# Patient Record
Sex: MALE | Race: BLACK OR AFRICAN AMERICAN | NOT HISPANIC OR LATINO | Employment: OTHER | URBAN - METROPOLITAN AREA
[De-identification: names, ages, dates, MRNs, and addresses within clinical notes are randomized per-mention and may not be internally consistent; named-entity substitution may affect disease eponyms.]

---

## 2017-08-13 ENCOUNTER — HOSPITAL ENCOUNTER (EMERGENCY)
Facility: HOSPITAL | Age: 57
Discharge: HOME | End: 2017-08-13
Attending: INTERNAL MEDICINE
Payer: MEDICARE

## 2017-08-13 VITALS
RESPIRATION RATE: 18 BRPM | SYSTOLIC BLOOD PRESSURE: 150 MMHG | WEIGHT: 197 LBS | BODY MASS INDEX: 27.58 KG/M2 | HEART RATE: 76 BPM | DIASTOLIC BLOOD PRESSURE: 97 MMHG | OXYGEN SATURATION: 98 % | HEIGHT: 71 IN | TEMPERATURE: 98 F

## 2017-08-13 DIAGNOSIS — R55 SYNCOPE: ICD-10-CM

## 2017-08-13 DIAGNOSIS — H54.3 BLIND IN BOTH EYES: ICD-10-CM

## 2017-08-13 DIAGNOSIS — N18.31 CHRONIC KIDNEY DISEASE (CKD) STAGE G3A/A1, MODERATELY DECREASED GLOMERULAR FILTRATION RATE (GFR) BETWEEN 45-59 ML/MIN/1.73 SQUARE METER AND ALBUMINURIA CREATININE RATIO LESS THAN 30 MG/G: ICD-10-CM

## 2017-08-13 DIAGNOSIS — E86.0 DEHYDRATION: ICD-10-CM

## 2017-08-13 DIAGNOSIS — N17.9 AKI (ACUTE KIDNEY INJURY): ICD-10-CM

## 2017-08-13 DIAGNOSIS — G40.909 SEIZURE CEREBRAL: Primary | ICD-10-CM

## 2017-08-13 LAB
ALBUMIN SERPL BCP-MCNC: 4.2 G/DL
ALLENS TEST: ABNORMAL
ALP SERPL-CCNC: 64 U/L
ALT SERPL W/O P-5'-P-CCNC: 30 U/L
ANION GAP SERPL CALC-SCNC: 12 MMOL/L
AST SERPL-CCNC: 19 U/L
BASOPHILS # BLD AUTO: 0.02 K/UL
BASOPHILS NFR BLD: 0.2 %
BILIRUB SERPL-MCNC: 0.3 MG/DL
BNP SERPL-MCNC: 23 PG/ML
BUN SERPL-MCNC: 21 MG/DL
CALCIUM SERPL-MCNC: 9.7 MG/DL
CHLORIDE SERPL-SCNC: 110 MMOL/L
CO2 SERPL-SCNC: 20 MMOL/L
CREAT SERPL-MCNC: 1.8 MG/DL
DELSYS: ABNORMAL
DIFFERENTIAL METHOD: ABNORMAL
EOSINOPHIL # BLD AUTO: 0.7 K/UL
EOSINOPHIL NFR BLD: 8.2 %
ERYTHROCYTE [DISTWIDTH] IN BLOOD BY AUTOMATED COUNT: 13.4 %
EST. GFR  (AFRICAN AMERICAN): 48 ML/MIN/1.73 M^2
EST. GFR  (NON AFRICAN AMERICAN): 41 ML/MIN/1.73 M^2
FIO2: 21
GLUCOSE SERPL-MCNC: 139 MG/DL (ref 70–110)
GLUCOSE SERPL-MCNC: 147 MG/DL
HCO3 UR-SCNC: 21.8 MMOL/L (ref 24–28)
HCT VFR BLD AUTO: 44.7 %
HCT VFR BLD CALC: 42 %PCV (ref 36–54)
HGB BLD-MCNC: 15.7 G/DL
LYMPHOCYTES # BLD AUTO: 2.1 K/UL
LYMPHOCYTES NFR BLD: 24.2 %
MCH RBC QN AUTO: 31.7 PG
MCHC RBC AUTO-ENTMCNC: 35.1 G/DL
MCV RBC AUTO: 90 FL
MODE: ABNORMAL
MONOCYTES # BLD AUTO: 0.6 K/UL
MONOCYTES NFR BLD: 7.4 %
NEUTROPHILS # BLD AUTO: 5.1 K/UL
NEUTROPHILS NFR BLD: 60 %
PCO2 BLDA: 37 MMHG (ref 35–45)
PH SMN: 7.38 [PH] (ref 7.35–7.45)
PLATELET # BLD AUTO: 215 K/UL
PMV BLD AUTO: 10.3 FL
PO2 BLDA: 39 MMHG (ref 40–60)
POC BE: -3 MMOL/L
POC IONIZED CALCIUM: 1.19 MMOL/L (ref 1.06–1.42)
POC SATURATED O2: 72 % (ref 95–100)
POTASSIUM BLD-SCNC: 3.9 MMOL/L (ref 3.5–5.1)
POTASSIUM SERPL-SCNC: 4.1 MMOL/L
PROT SERPL-MCNC: 8.5 G/DL
RBC # BLD AUTO: 4.95 M/UL
SAMPLE: ABNORMAL
SITE: ABNORMAL
SODIUM BLD-SCNC: 145 MMOL/L (ref 136–145)
SODIUM SERPL-SCNC: 142 MMOL/L
TROPONIN I SERPL DL<=0.01 NG/ML-MCNC: <0.006 NG/ML
WBC # BLD AUTO: 8.54 K/UL

## 2017-08-13 PROCEDURE — 82800 BLOOD PH: CPT

## 2017-08-13 PROCEDURE — 25000003 PHARM REV CODE 250: Performed by: INTERNAL MEDICINE

## 2017-08-13 PROCEDURE — 85014 HEMATOCRIT: CPT

## 2017-08-13 PROCEDURE — 93005 ELECTROCARDIOGRAM TRACING: CPT

## 2017-08-13 PROCEDURE — 84484 ASSAY OF TROPONIN QUANT: CPT

## 2017-08-13 PROCEDURE — 84132 ASSAY OF SERUM POTASSIUM: CPT | Mod: 91

## 2017-08-13 PROCEDURE — 80053 COMPREHEN METABOLIC PANEL: CPT

## 2017-08-13 PROCEDURE — 96360 HYDRATION IV INFUSION INIT: CPT

## 2017-08-13 PROCEDURE — 84295 ASSAY OF SERUM SODIUM: CPT

## 2017-08-13 PROCEDURE — 83880 ASSAY OF NATRIURETIC PEPTIDE: CPT

## 2017-08-13 PROCEDURE — 99284 EMERGENCY DEPT VISIT MOD MDM: CPT | Mod: 25

## 2017-08-13 PROCEDURE — 82330 ASSAY OF CALCIUM: CPT

## 2017-08-13 PROCEDURE — 93010 ELECTROCARDIOGRAM REPORT: CPT | Mod: ,,, | Performed by: INTERNAL MEDICINE

## 2017-08-13 PROCEDURE — 82803 BLOOD GASES ANY COMBINATION: CPT

## 2017-08-13 PROCEDURE — 85025 COMPLETE CBC W/AUTO DIFF WBC: CPT

## 2017-08-13 PROCEDURE — 96361 HYDRATE IV INFUSION ADD-ON: CPT

## 2017-08-13 PROCEDURE — 99900035 HC TECH TIME PER 15 MIN (STAT)

## 2017-08-13 RX ORDER — ASPIRIN 325 MG
325 TABLET ORAL
Status: COMPLETED | OUTPATIENT
Start: 2017-08-13 | End: 2017-08-13

## 2017-08-13 RX ADMIN — ASPIRIN 325 MG ORAL TABLET 325 MG: 325 PILL ORAL at 06:08

## 2017-08-13 RX ADMIN — SODIUM CHLORIDE 500 ML: 0.9 INJECTION, SOLUTION INTRAVENOUS at 07:08

## 2017-08-13 RX ADMIN — SODIUM CHLORIDE 500 ML: 0.9 INJECTION, SOLUTION INTRAVENOUS at 06:08

## 2017-08-13 NOTE — ED PROVIDER NOTES
"SCRIBE #1 NOTE: I, Eladio Romero, am scribing for, and in the presence of, Tico Catalan MD. I have scribed the entire note.      History      Chief Complaint   Patient presents with    Loss of Consciousness     reports change in amlodipine from 2.5 to 5mg. States he feels fine upon arrival. "i might have gotten overheated" . One episode of vomiting       Review of patient's allergies indicates:  No Known Allergies     HPI   HPI    8/13/2017, 5:29 PM   History obtained from the patient      History of Present Illness: Indra Deleon is a 56 y.o. male patient w/ PMHx of HIV and Seizures presents to the Emergency Department for further evaluation after a syncopal episode which onset suddenly this PM. Per EMS, pt was "at [facility] when he began to feel hot, and then lost consciousness." Symptoms are improved and moderate in severity. Upon arrival, EMS states that the patient was cool to the touch, pale, and diaphoretic. EMS reports a blood sugar of 142. EMS states that the pt's amlodipine medication was increased from 2.5- 5 today, but the patient states that he has not taken the increased medication today. Pt states that he "thinks he got overheated." No mitigating or exacerbating factors reported. No other associated sxs reported. Patient denies any head trauma/ injury, dizziness, focal weakness/ numbness, speech difficulty, HA, chest pain, SOB, and all other sxs at this time. No further complaints or concerns at this time.         Arrival mode: EMS    PCP: Mukesh Donovan MD       Past Medical History:  Past Medical History:   Diagnosis Date    Blind     Diabetes mellitus     HIV (human immunodeficiency virus infection) 2009    Hypertension     Seizures        Past Surgical History:  Past Surgical History:   Procedure Laterality Date    BRAIN SURGERY      KNEE SURGERY           Family History:  History reviewed. No pertinent family history.    Social History:  Social History     Social " History Main Topics    Smoking status: Never Smoker    Smokeless tobacco: Unknown    Alcohol use No    Drug use: Unknown    Sexual activity: Unknown       ROS   Review of Systems   Constitutional: Negative for chills, diaphoresis and fever.   HENT:        - head trauma/ injury     Respiratory: Negative for cough and shortness of breath.    Cardiovascular: Negative for chest pain.   Gastrointestinal: Negative for abdominal pain, constipation, diarrhea, nausea and vomiting.   Genitourinary: Negative for difficulty urinating, dysuria, frequency, hematuria and urgency.   Musculoskeletal: Negative for back pain.   Skin: Negative for rash.   Neurological: Positive for syncope. Negative for dizziness, speech difficulty, weakness, light-headedness, numbness and headaches.   All other systems reviewed and are negative.      Physical Exam      Initial Vitals [08/13/17 1721]   BP Pulse Resp Temp SpO2   (!) 128/90 88 18 98 °F (36.7 °C) 98 %      MAP       102.67          Physical Exam  Nursing Notes and Vital Signs Reviewed.  Constitutional: Patient is in no apparent distress. Well-developed and well-nourished.  Head: Atraumatic. Normocephalic.  Eyes: PERRL. EOM intact. Conjunctivae are not pale. No scleral icterus. Pt is blind in both eyes.   ENT: Mucous membranes are moist. Oropharynx is clear and symmetric.    Neck: Supple. Full ROM. No lymphadenopathy.  Cardiovascular: Regular rate. Regular rhythm. No murmurs, rubs, or gallops. Distal pulses are 2+ and symmetric.  Pulmonary/Chest: No respiratory distress. Clear to auscultation bilaterally. No wheezing, rales, or rhonchi.  Abdominal: Soft and non-distended.  There is no tenderness.  No rebound, guarding, or rigidity. Good bowel sounds.  Genitourinary: No CVA tenderness  Musculoskeletal: Moves all extremities. No obvious deformities. No edema. No calf tenderness.  Skin: Warm and dry.  Neurological:  Alert, awake, and appropriate.  Normal speech.  No acute focal  "neurological deficits are appreciated.  Psychiatric: Normal affect. Good eye contact. Appropriate in content.    ED Course    Procedures  ED Vital Signs:  Vitals:    08/13/17 1721 08/13/17 1822 08/13/17 1824 08/13/17 1826   BP: (!) 128/90 (!) 143/83 (!) 148/86 (!) 152/88   Pulse: 88 74 84 91   Resp: 18      Temp: 98 °F (36.7 °C)      TempSrc: Oral      SpO2: 98%      Weight: 89.4 kg (197 lb)      Height: 5' 11" (1.803 m)       08/13/17 2001   BP: (!) 150/97   Pulse: 76   Resp:    Temp: 97.9 °F (36.6 °C)   TempSrc: Oral   SpO2: 98%   Weight:    Height:        Abnormal Lab Results:  Labs Reviewed   CBC W/ AUTO DIFFERENTIAL - Abnormal; Notable for the following:        Result Value    MCH 31.7 (*)     Eos # 0.7 (*)     Eosinophil% 8.2 (*)     All other components within normal limits   COMPREHENSIVE METABOLIC PANEL - Abnormal; Notable for the following:     CO2 20 (*)     Glucose 147 (*)     BUN, Bld 21 (*)     Creatinine 1.8 (*)     Total Protein 8.5 (*)     eGFR if  48 (*)     eGFR if non  41 (*)     All other components within normal limits   ISTAT PROCEDURE - Abnormal; Notable for the following:     POC PO2 39 (*)     POC HCO3 21.8 (*)     POC SATURATED O2 72 (*)     POC Glucose 139 (*)     All other components within normal limits   TROPONIN I   B-TYPE NATRIURETIC PEPTIDE        All Lab Results:  Results for orders placed or performed during the hospital encounter of 08/13/17   CBC auto differential   Result Value Ref Range    WBC 8.54 3.90 - 12.70 K/uL    RBC 4.95 4.60 - 6.20 M/uL    Hemoglobin 15.7 14.0 - 18.0 g/dL    Hematocrit 44.7 40.0 - 54.0 %    MCV 90 82 - 98 fL    MCH 31.7 (H) 27.0 - 31.0 pg    MCHC 35.1 32.0 - 36.0 g/dL    RDW 13.4 11.5 - 14.5 %    Platelets 215 150 - 350 K/uL    MPV 10.3 9.2 - 12.9 fL    Gran # 5.1 1.8 - 7.7 K/uL    Lymph # 2.1 1.0 - 4.8 K/uL    Mono # 0.6 0.3 - 1.0 K/uL    Eos # 0.7 (H) 0.0 - 0.5 K/uL    Baso # 0.02 0.00 - 0.20 K/uL    Gran% 60.0 38.0 - " 73.0 %    Lymph% 24.2 18.0 - 48.0 %    Mono% 7.4 4.0 - 15.0 %    Eosinophil% 8.2 (H) 0.0 - 8.0 %    Basophil% 0.2 0.0 - 1.9 %    Differential Method Automated    Comprehensive metabolic panel   Result Value Ref Range    Sodium 142 136 - 145 mmol/L    Potassium 4.1 3.5 - 5.1 mmol/L    Chloride 110 95 - 110 mmol/L    CO2 20 (L) 23 - 29 mmol/L    Glucose 147 (H) 70 - 110 mg/dL    BUN, Bld 21 (H) 6 - 20 mg/dL    Creatinine 1.8 (H) 0.5 - 1.4 mg/dL    Calcium 9.7 8.7 - 10.5 mg/dL    Total Protein 8.5 (H) 6.0 - 8.4 g/dL    Albumin 4.2 3.5 - 5.2 g/dL    Total Bilirubin 0.3 0.1 - 1.0 mg/dL    Alkaline Phosphatase 64 55 - 135 U/L    AST 19 10 - 40 U/L    ALT 30 10 - 44 U/L    Anion Gap 12 8 - 16 mmol/L    eGFR if African American 48 (A) >60 mL/min/1.73 m^2    eGFR if non African American 41 (A) >60 mL/min/1.73 m^2   Troponin I #1   Result Value Ref Range    Troponin I <0.006 0.000 - 0.026 ng/mL   B-Type natriuretic peptide (BNP)   Result Value Ref Range    BNP 23 0 - 99 pg/mL   ISTAT PROCEDURE   Result Value Ref Range    POC PH 7.377 7.35 - 7.45    POC PCO2 37.0 35 - 45 mmHg    POC PO2 39 (LL) 40 - 60 mmHg    POC HCO3 21.8 (L) 24 - 28 mmol/L    POC BE -3 -2 to 2 mmol/L    POC SATURATED O2 72 (L) 95 - 100 %    POC Glucose 139 (H) 70 - 110 mg/dL    POC Sodium 145 136 - 145 mmol/L    POC Potassium 3.9 3.5 - 5.1 mmol/L    POC Ionized Calcium 1.19 1.06 - 1.42 mmol/L    POC Hematocrit 42 36 - 54 %PCV    Sample VENOUS     Site Other     Allens Test N/A     DelSys Room Air     Mode SPONT     FiO2 21          Imaging Results:  Imaging Results          X-Ray Chest AP Portable (Final result)  Result time 08/13/17 18:08:48    Final result by Jorge A Harkins III, MD (08/13/17 18:08:48)                 Impression:     As above. No acute cardiopulmonary abnormality suggested.      Electronically signed by: JORGE A HARKINS MD  Date:     08/13/17  Time:    18:08              Narrative:    Chest x-ray, single view.    Clinical  "indication: Chest pain.    Normal heart size. clear lungs. Radiopaque density on the left presumably represents a ventricular peritoneal shunt. No significant change since February 2016.                             The EKG was ordered, reviewed, and independently interpreted by the ED provider.  Interpretation time: 17:28  Rate: 89 BPM  Rhythm: normal sinus rhythm  Interpretation: No St & T wave abnormality. No STEMI.           The Emergency Provider reviewed the vital signs and test results, which are outlined above.    ED Discussion     7:08 PM: Re-evaluated pt. Pt's family is at bedside. Per the family, while at [facility], the patient went from a sitting to standing position when he stated that he "felt funny" and requested water. The family states that the patient then sat back down and lost consciousness, becoming unresponsive for 4-5 minutes. The family denies any shaking like movement, but reports an episode of emesis. The family states that the patient became responsive with a speech difficulty described as "mumbling." The family states that patient returned to baseline 3 minutes after regaining consciousness. Pt's mother reports that emesis has occurred when the pt has had seizures in the past. Pt is currently on depakote (250mg) and keppra (500mg) for seizures. D/w pt and family all pertinent results. D/w pt and family any concerns expressed at this time. Answered all questions. Pt expresses understanding at this time.    7:43 PM: Reassessed pt at this time.  Pt states his condition has improved at this time. Discussed with pt all pertinent ED information and results. Discussed pt dx and plan of tx. Gave pt all f/u and return to the ED instructions. All questions and concerns were addressed at this time. Pt expresses understanding of information and instructions, and is comfortable with plan to discharge. Pt is stable for discharge.    I discussed with patient and/or family/caretaker that evaluation in the " ED does not suggest any emergent or life threatening medical conditions requiring immediate intervention beyond what was provided in the ED, and I believe patient is safe for discharge.  Regardless, an unremarkable evaluation in the ED does not preclude the development or presence of a serious of life threatening condition. As such, patient was instructed to return immediately for any worsening or change in current symptoms.        ED Medication(s):  Medications   aspirin tablet 325 mg (325 mg Oral Given 8/13/17 1813)   sodium chloride 0.9% bolus 500 mL (0 mLs Intravenous Stopped 8/13/17 1915)   sodium chloride 0.9% bolus 500 mL (0 mLs Intravenous Stopped 8/13/17 1956)       Discharge Medication List as of 8/13/2017  7:54 PM          Follow-up Information     Go to  [facility] - .    Specialty:  Emergency Medicine  Why:  If symptoms worsen  Contact information:  [id number] [facility] [address] [phone]           dr. JIMMIE Hawkins of Neurology. Schedule an appointment as soon as possible for a visit in 3 days.                   Medical Decision Making    Medical Decision Making:   Clinical Tests:   Lab Tests: Ordered and Reviewed  Radiological Study: Ordered and Reviewed  Medical Tests: Ordered and Reviewed           Scribe Attestation:   Scribe #1: I performed the above scribed service and the documentation accurately describes the services I performed. I attest to the accuracy of the note.    Attending:   Physician Attestation Statement for Scribe #1: I, Tico Catalan MD, personally performed the services described in this documentation, as scribed by Eladio Romero, in my presence, and it is both accurate and complete.          Clinical Impression       ICD-10-CM ICD-9-CM   1. Seizure cerebral I67.89 436   2. Syncope R55 780.2   3. Blind in both eyes H54.0 369.00   4. Dehydration E86.0 276.51   5. NICOLE (acute kidney injury) N17.9 584.9   6. Chronic kidney disease  (CKD) stage G3a/A1, moderately decreased glomerular filtration rate (GFR) between 45-59 mL/min/1.73 square meter and albuminuria creatinine ratio less than 30 mg/g N18.3 585.3       Disposition:   Disposition: Discharged  Condition: Stable         Tico Catalan MD  08/13/17 2032

## 2017-08-14 NOTE — DISCHARGE INSTRUCTIONS
1.  Acute kidney injury: Likely from dehydration: Given fluids in the emergency room    2.  Chronic kidney disease stage III: Need to follow closely with nephrologist along with medications you're on including metformin.    3.  Syncope: Given IV fluids.  Avoid dehydration.  Avoid low blood pressure.  Monitor blood pressure at home.    4.  Seizure disorder: Follow-up with the neurology for adjustment of dosing and further recommendation.  Today's episode could have been a seizure as well.

## 2018-05-08 DIAGNOSIS — H54.3 BILATERAL BLINDNESS: Primary | ICD-10-CM

## 2018-05-08 DIAGNOSIS — H54.40 VISUAL LOSS, ONE EYE, NO LIGHT PERCEPTION (NLP): ICD-10-CM

## 2018-05-14 ENCOUNTER — CLINICAL SUPPORT (OUTPATIENT)
Dept: AUDIOLOGY | Facility: CLINIC | Age: 58
End: 2018-05-14
Payer: MEDICARE

## 2018-05-14 ENCOUNTER — OFFICE VISIT (OUTPATIENT)
Dept: OTOLARYNGOLOGY | Facility: CLINIC | Age: 58
End: 2018-05-14
Payer: MEDICARE

## 2018-05-14 VITALS
TEMPERATURE: 99 F | SYSTOLIC BLOOD PRESSURE: 147 MMHG | DIASTOLIC BLOOD PRESSURE: 102 MMHG | HEART RATE: 130 BPM | WEIGHT: 195 LBS | HEIGHT: 71 IN | RESPIRATION RATE: 16 BRPM | BODY MASS INDEX: 27.3 KG/M2

## 2018-05-14 DIAGNOSIS — B20 HIV (HUMAN IMMUNODEFICIENCY VIRUS INFECTION): ICD-10-CM

## 2018-05-14 DIAGNOSIS — H90.41 SENSORINEURAL HEARING LOSS (SNHL) OF RIGHT EAR WITH UNRESTRICTED HEARING OF LEFT EAR: Primary | ICD-10-CM

## 2018-05-14 DIAGNOSIS — K11.1 PAROTID GLAND ENLARGEMENT: Primary | ICD-10-CM

## 2018-05-14 DIAGNOSIS — H93.291 IMPAIRED AUDITORY DISCRIMINATION, RIGHT: ICD-10-CM

## 2018-05-14 DIAGNOSIS — H90.41 SENSORINEURAL HEARING LOSS (SNHL) OF RIGHT EAR WITH UNRESTRICTED HEARING OF LEFT EAR: ICD-10-CM

## 2018-05-14 PROCEDURE — 99204 OFFICE O/P NEW MOD 45 MIN: CPT | Mod: S$GLB,,, | Performed by: ORTHOPAEDIC SURGERY

## 2018-05-14 PROCEDURE — 92567 TYMPANOMETRY: CPT | Mod: S$GLB,,, | Performed by: AUDIOLOGIST-HEARING AID FITTER

## 2018-05-14 PROCEDURE — 3008F BODY MASS INDEX DOCD: CPT | Mod: CPTII,S$GLB,, | Performed by: ORTHOPAEDIC SURGERY

## 2018-05-14 PROCEDURE — 92557 COMPREHENSIVE HEARING TEST: CPT | Mod: S$GLB,,, | Performed by: AUDIOLOGIST-HEARING AID FITTER

## 2018-05-14 PROCEDURE — 99999 PR PBB SHADOW E&M-EST. PATIENT-LVL IV: CPT | Mod: PBBFAC,,, | Performed by: ORTHOPAEDIC SURGERY

## 2018-05-14 NOTE — PROGRESS NOTES
Indra Deleon was seen 05/14/2018 for an audiological evaluation prior to Dr. Llanos.  Patient complains of hearing loss in the right ear, onset about ten years ago following surgery to remove a brain tumor.  His last hearing test was done at [facility] and says a hearing aid was recommended.  He continues to follow regularly with [facility].  Patient is visually impaired (bilateral blindness).     Results reveal a severe sensorineural hearing loss 250-8000 Hz for the right ear, and normal hearing 250-8000 Hz for the left ear.   Speech Reception Thresholds were  85 dBHL for the right ear and 15 dBHL for the left ear.   Word recognition scores were could not test because patient could not tolerate suprathreshold presentation for the right ear and excellent for the left ear.   Tympanograms were Type A, normal for the right ear and Type A, normal for the left ear.    Patient was counseled on the above findings.       Recommendations:  1. ENT  2. CROS/[facility] evaluation, if and when patient is motivated.  I do not believe patient may tolerate or benefit from conventional amplification (hearing aid) in the right ear.  3. Hearing protection around loud noises

## 2018-05-14 NOTE — PROGRESS NOTES
"Subjective:       Patient ID: Indra Deleon is a 57 y.o. male.    Chief Complaint: Hearing Loss    Patient is a very pleasant 57 year old gentleman here to see me today for the first time for evaluation of several issues.  He was sent by an outside PCP, and I do not have any of those records available.  He says that he has had some intermittent pain and swelling in the area of his parotid glands bilaterally, but they are normal today.  He has never required any oral antibiotics for parotitis.  He says that he last had pain in his parotids several months ago, happened once, and lasted for several seconds.  Second, he has a history of a "brain tumor" that was resected.  He says that he lost hearing in his right ear at the time of his surgery.  Per the patient, his surgery was about 10 years ago at [facility].  I looked via Epic, but could not locate any of those records today.  He had an audiogram today, said that he last had one done at [facility].  [facility]      Review of Systems   Constitutional: Negative for fatigue, fever and unexpected weight change.   HENT: Positive for hearing loss (right). Negative for congestion, ear discharge, ear pain, facial swelling, nosebleeds, postnasal drip, rhinorrhea, sinus pressure, sneezing, sore throat, tinnitus, trouble swallowing and voice change.    Eyes: Positive for visual disturbance (legally blind). Negative for discharge, redness and itching.   Respiratory: Negative for cough, choking, shortness of breath and wheezing.    Cardiovascular: Negative for chest pain and palpitations.   Gastrointestinal: Negative for abdominal pain.        No reflux.   Musculoskeletal: Negative for neck pain.   Neurological: Positive for seizures. Negative for dizziness, facial asymmetry, light-headedness and headaches.   Hematological: Negative for adenopathy. Does not bruise/bleed easily.   Psychiatric/Behavioral: Negative for agitation, behavioral " problems, confusion and decreased concentration.       Objective:      Physical Exam   Constitutional: He is oriented to person, place, and time. Vital signs are normal. He appears well-developed and well-nourished. No distress.   HENT:   Head: Normocephalic and atraumatic.   Right Ear: Hearing, tympanic membrane, external ear and ear canal normal.   Left Ear: Hearing, tympanic membrane, external ear and ear canal normal.   Nose: Nose normal. No mucosal edema, rhinorrhea, nasal deformity or septal deviation.   Mouth/Throat: Uvula is midline, oropharynx is clear and moist and mucous membranes are normal. No trismus in the jaw. Normal dentition. No uvula swelling. No oropharyngeal exudate or posterior oropharyngeal edema.   Prominent parotids bilaterally, no discrete masses palpated, clear saliva from the parotid ducts, no tenderness over parotid   Eyes: Conjunctivae and EOM are normal. Pupils are equal, round, and reactive to light. Right eye exhibits no chemosis. Left eye exhibits no chemosis. Right conjunctiva is not injected. Left conjunctiva is not injected. No scleral icterus.   Neck: Trachea normal and phonation normal. No tracheal tenderness present. No tracheal deviation present. No thyroid mass and no thyromegaly present.   Cardiovascular: Intact distal pulses.    Pulmonary/Chest: Effort normal. No accessory muscle usage or stridor. No respiratory distress.   Lymphadenopathy:        Head (right side): No submental, no submandibular, no preauricular and no posterior auricular adenopathy present.        Head (left side): No submental, no submandibular, no preauricular and no posterior auricular adenopathy present.     He has no cervical adenopathy.        Right cervical: No superficial cervical and no deep cervical adenopathy present.       Left cervical: No superficial cervical and no deep cervical adenopathy present.   Neurological: He is alert and oriented to person, place, and time. No cranial nerve  deficit.   Skin: Skin is warm and dry. No rash noted. No erythema.   Psychiatric: He has a normal mood and affect. His behavior is normal. Thought content normal.       AUDIOGRAM:  Results reveal a severe sensorineural hearing loss 250-8000 Hz for the right ear, and normal hearing 250-8000 Hz for the left ear.   Speech Reception Thresholds were  85 dBHL for the right ear and 15 dBHL for the left ear.   Word recognition scores were could not test because patient could not tolerate suprathreshold presentation for the right ear and excellent for the left ear.   Tympanograms were Type A, normal for the right ear and Type A, normal for the left ear.     Patient was counseled on the above findings.  I do not believe patient may tolerate amplification (hearing aid) in the right ear.       Assessment:       1. Parotid gland enlargement    2. HIV (human immunodeficiency virus infection)    3. Sensorineural hearing loss (SNHL) of right ear with unrestricted hearing of left ear        Plan:       1.  Parotid gland enlargement:  We discussed that one hallmark of HIV infection is enlargement of the parotid glands, most often with bilateral benign lymphoepithelial cysts.  At this point, I do not feel any abnormal masses or lesions in his parotid glands.  I would recommend an ultrasound of the area, and will call him with results.  If it does show symmetric gland involvement as I am currently expecting, no further treatment is needed at this time.  2.  Hearing loss right ear:  He has a profound hearing loss of the right ear, and likely would not do well with a conventional hearing aid in that ear.  He would likely need a CROS hearing aid.  He is an audiological candidate for a BAHA if he is interested, but at this time he is not motivated as his hearing loss is not bothering him to that degree.  He does follow consistently with [facility]surgery and [facility] per the patient, though I do not have those records.  If he has  any issues in the future regarding his hearing, please return to clinic for repeat exam and would likely order MRI at that time.

## 2018-05-16 ENCOUNTER — TELEPHONE (OUTPATIENT)
Dept: RADIOLOGY | Facility: HOSPITAL | Age: 58
End: 2018-05-16

## 2018-06-05 ENCOUNTER — CLINICAL SUPPORT (OUTPATIENT)
Dept: REHABILITATION | Facility: OTHER | Age: 58
End: 2018-06-05
Attending: OPHTHALMOLOGY
Payer: MEDICARE

## 2018-06-05 NOTE — PROGRESS NOTES
"[employer] Vision Pro   DEMONSTRATION     Name: Indra Deleon  MRN:[id number]  Physician: Lavon Johnston, *    Diagnosis: Bilateral blindness    Date of service: 6/5/2018  Start time: 10:52 AM  End time: 11: 55 AM  Total time: 60 mins     Subjective     Statement: "I've been following this thing for a long time."   Chief Complaint: "So, I'm not going to get to see people?"   Pain: no c/o at this time     History     Medical History:   PMH:   Past Medical History:   Diagnosis Date    Blind     Diabetes mellitus     HIV (human immunodeficiency virus infection) 2009    Hypertension     Seizures       Past Surgical History:   Procedure Laterality Date    BRAIN SURGERY      KNEE SURGERY       Social Hx: Lives in [place], [place] with mother  Home access: single story house, 0 BEN  Family dynamic: ex-wife present for assistance  Assistive Device: none    Occupation/hobbies/homemaking: enjoys going out to eat      Objective     COGNITIVE   Oriented: Person, Place, Time and Situation  Behaviors: Follows multiple commands  Follows Commands/attention: Follows two-step commands, required multiple explanations   Communication: clear/fluent, mild dysarthria     ORIENTATION TO DEVICE: 6/4     Objective    DEVICE     Don device Able to don device without difficulty or assistance Completed   Pt required moderate assistance with orienting device to his head. Pt required and requested increased tactile cues.    Turn device on Able to turn [employer] control unit on in Wifi mode without difficulty or assistance. Completed Pt required moderate assistance, turning off device twice during session while attempting to change intensity.    Turn device off Able to turn device off without assistance or difficulty. Completed    Scroll through Menus (Image/System) Utilized function buttons (large central button) to scan through menu options Completed Pt with limited ability at this time, requiring tactile cues throughout training.  "   Navigate Intensity Demontrated ability to adjust/manipulate intensity level independently.  Completed Pt able to demonstrate use of intensity 15% of session.    Navigate Zoom Demonstrated ability to adjust/manipuilate zoom level indepedently throughout session. Completed Therapist guided pt in adjusting zoom button for demonstrative purposes.    Navigate Invert Demonstrated ability to adjust/manipuilate invert on/off indepedently throughout session. Not Completed    Navigate Contrast Demonstrated ability to adjust/manipuilate  Contrast high/medium/low indepedently throughout session. Not Completed    Navigate Edge Enhancement Demonstrated ability to adjust/manipuilate  Edge enhancement indepedently throughout session. N/A   Manual Control of Camera  Demonstrated ability to adjust/manipuilate direction of camera indepedently throughout session. Not Completed, therapist described direction of camera, and had pt explore prior to donning device.         SKILLS     Intensity Determine appropriate intensity level to comfort level  Completed, Pt participated in process x3 trials; noted increased saliva production, requiring cloth.    Location of stimulus Determine location of large object (margaret) and direction Completed, Pt completed with 80% accuracy   Scan Environment (controlled) Determine location of small objects (cup/ball)  Completed, with maximal re-direction to use scanning strategies, with 30% accuracy     Be able describe difference in smaller objects  Not Completed    Scan Environment  (with mobility)  Determine location of person and stimulation on floor Not Completed, Therapist educated and encouraged patient to complete cervical ROM daily. With tactile guidance, pt participated in R/L rotation and side bending and cervical flexion and extension. Caregiver able to verbally recall.           No environmental, cultural, spiritual, developmental or education needs expressed or noted    Assessment     Mr. Deleon  "present for one hour demonstration on [employer] Vision Pro device. Pt eager to learn capability of device and determine if patient is interested in extended training. Pt arrived late from [place], [place] with pleasant demeanor. Pt presented with limited neck ROM, resting posture in extension. Pt with decreased spatial awareness and limited FMC. Pt with occasional hand tremors. Pt able to tolerate use of device with rest breaks and one water break. Pt with decreased use of device strategies and poor scanning of environment. Device did not meet pt's expectation of his experience noting, "After being blind for so long I was just hoping to see something."      Plan     Agreed for therapist or [employer] representative reach out to patient post demonstration to receive feedback and plan for next steps in process    MARNIE Steve, 6/5/2018              "

## 2018-10-23 ENCOUNTER — HOSPITAL ENCOUNTER (EMERGENCY)
Facility: HOSPITAL | Age: 58
Discharge: HOME | End: 2018-10-23
Attending: EMERGENCY MEDICINE
Payer: MEDICARE

## 2018-10-23 VITALS
WEIGHT: 205 LBS | HEIGHT: 71 IN | HEART RATE: 61 BPM | TEMPERATURE: 99 F | OXYGEN SATURATION: 99 % | DIASTOLIC BLOOD PRESSURE: 88 MMHG | BODY MASS INDEX: 28.7 KG/M2 | RESPIRATION RATE: 18 BRPM | SYSTOLIC BLOOD PRESSURE: 150 MMHG

## 2018-10-23 DIAGNOSIS — R42 DIZZINESS: Primary | ICD-10-CM

## 2018-10-23 LAB
ALBUMIN SERPL BCP-MCNC: 3.7 G/DL
ALP SERPL-CCNC: 40 U/L
ALT SERPL W/O P-5'-P-CCNC: 45 U/L
ANION GAP SERPL CALC-SCNC: 9 MMOL/L
AST SERPL-CCNC: 21 U/L
BASOPHILS # BLD AUTO: 0.02 K/UL
BASOPHILS NFR BLD: 0.3 %
BILIRUB SERPL-MCNC: 0.4 MG/DL
BUN SERPL-MCNC: 20 MG/DL
CALCIUM SERPL-MCNC: 8.9 MG/DL
CHLORIDE SERPL-SCNC: 109 MMOL/L
CO2 SERPL-SCNC: 23 MMOL/L
CREAT SERPL-MCNC: 1.3 MG/DL
DIFFERENTIAL METHOD: ABNORMAL
EOSINOPHIL # BLD AUTO: 0.4 K/UL
EOSINOPHIL NFR BLD: 6.6 %
ERYTHROCYTE [DISTWIDTH] IN BLOOD BY AUTOMATED COUNT: 13.2 %
EST. GFR  (AFRICAN AMERICAN): >60 ML/MIN/1.73 M^2
EST. GFR  (NON AFRICAN AMERICAN): >60 ML/MIN/1.73 M^2
GLUCOSE SERPL-MCNC: 112 MG/DL
HCT VFR BLD AUTO: 42.8 %
HGB BLD-MCNC: 14.6 G/DL
LYMPHOCYTES # BLD AUTO: 2.2 K/UL
LYMPHOCYTES NFR BLD: 35.6 %
MCH RBC QN AUTO: 31.9 PG
MCHC RBC AUTO-ENTMCNC: 34.1 G/DL
MCV RBC AUTO: 94 FL
MONOCYTES # BLD AUTO: 0.7 K/UL
MONOCYTES NFR BLD: 11.7 %
NEUTROPHILS # BLD AUTO: 2.8 K/UL
NEUTROPHILS NFR BLD: 45.8 %
PLATELET # BLD AUTO: 171 K/UL
PMV BLD AUTO: 10 FL
POTASSIUM SERPL-SCNC: 4.1 MMOL/L
PROT SERPL-MCNC: 7.1 G/DL
RBC # BLD AUTO: 4.57 M/UL
SODIUM SERPL-SCNC: 141 MMOL/L
TROPONIN I SERPL DL<=0.01 NG/ML-MCNC: <0.006 NG/ML
WBC # BLD AUTO: 6.09 K/UL

## 2018-10-23 PROCEDURE — 80053 COMPREHEN METABOLIC PANEL: CPT

## 2018-10-23 PROCEDURE — 93005 ELECTROCARDIOGRAM TRACING: CPT

## 2018-10-23 PROCEDURE — 99284 EMERGENCY DEPT VISIT MOD MDM: CPT | Mod: 25

## 2018-10-23 PROCEDURE — 25000003 PHARM REV CODE 250: Performed by: EMERGENCY MEDICINE

## 2018-10-23 PROCEDURE — 93010 ELECTROCARDIOGRAM REPORT: CPT | Mod: ,,, | Performed by: INTERNAL MEDICINE

## 2018-10-23 PROCEDURE — 96360 HYDRATION IV INFUSION INIT: CPT

## 2018-10-23 PROCEDURE — 84484 ASSAY OF TROPONIN QUANT: CPT

## 2018-10-23 PROCEDURE — 85025 COMPLETE CBC W/AUTO DIFF WBC: CPT

## 2018-10-23 RX ORDER — MECLIZINE HYDROCHLORIDE 25 MG/1
25 TABLET ORAL 3 TIMES DAILY PRN
Qty: 20 TABLET | Refills: 0 | Status: SHIPPED | OUTPATIENT
Start: 2018-10-23 | End: 2021-06-17 | Stop reason: SDUPTHER

## 2018-10-23 RX ADMIN — SODIUM CHLORIDE 500 ML: 0.9 INJECTION, SOLUTION INTRAVENOUS at 05:10

## 2018-10-23 NOTE — ED PROVIDER NOTES
SCRIBE #1 NOTE: I, Chilango Alexander, am scribing for, and in the presence of, Alexandro Villarreal Jr., MD. I have scribed the HPI, ROS, and PEx.     SCRIBE #2 NOTE: I, Radha George, am scribing for, and in the presence of,  Ricky Quintero MD. I have scribed the remaining portions of the note not scribed by Scribe #1.     History      Chief Complaint   Patient presents with    Dizziness     earlier today. no longer has any symptomatic complaints       Review of patient's allergies indicates:  No Known Allergies     HPI   HPI    10/23/2018, 5:12 AM   History obtained from the patient      History of Present Illness: Indra Deleon is a 58 y.o. male patient with PMHx of HIV, DM, HTN, who presents to the Emergency Department for an evaluation of dizziness which onset gradually around 1-2am. Pt is blind, and notes he has experienced sxs similar before. Symptoms are constant and moderate in severity. No mitigating factors reported. Symptoms worsened this morning when the pt was trying to get out of bed. No associated sxs reported. Patient denies any ringing in the ear, nausea, vomiting, diarrhea, palpitations, fever, chills and all other sxs at this time.  No further complaints or concerns at this time.       Arrival mode: Personal vehicle      PCP: Mukesh Donovan MD       Past Medical History:  Past Medical History:   Diagnosis Date    Blind     Diabetes mellitus     HIV (human immunodeficiency virus infection) 2009    Hypertension     Seizures        Past Surgical History:  Past Surgical History:   Procedure Laterality Date    BRAIN SURGERY      KNEE SURGERY           Family History:  Hx reviewed, not pertinent.     Social History:  Social History     Tobacco Use    Smoking status: Never Smoker   Substance and Sexual Activity    Alcohol use: No    Drug use: Unknown    Sexual activity: Unknown       ROS   Review of Systems   Constitutional: Negative for chills and fever.   HENT: Negative for sore throat.          (-) ringing in the ear   Respiratory: Negative for shortness of breath.    Cardiovascular: Negative for chest pain and palpitations.   Gastrointestinal: Negative for diarrhea, nausea and vomiting.   Genitourinary: Negative for dysuria.   Musculoskeletal: Negative for back pain.   Skin: Negative for rash.   Neurological: Positive for dizziness. Negative for weakness.   Hematological: Does not bruise/bleed easily.   All other systems reviewed and are negative.      Physical Exam      Initial Vitals [10/23/18 0503]   BP Pulse Resp Temp SpO2   (!) 156/87 61 18 98.6 °F (37 °C) 99 %      MAP       --          Physical Exam  Nursing Notes and Vital Signs Reviewed.  Constitutional: Patient is in no acute distress. Well-developed and well-nourished. Blind.  Head: Atraumatic. Normocephalic.  Eyes: PERRL. EOM intact. Conjunctivae are not pale. No scleral icterus.  ENT: Mucous membranes are moist. Oropharynx is clear and symmetric.    Neck: Supple. Full ROM. No lymphadenopathy.  Cardiovascular: Regular rate. Regular rhythm. No murmurs, rubs, or gallops. Distal pulses are 2+ and symmetric.  Pulmonary/Chest: No respiratory distress. Clear to auscultation bilaterally. No wheezing or rales.  Abdominal: Soft and non-distended.  There is no tenderness.  No rebound, guarding, or rigidity. Good bowel sounds.  Genitourinary: No CVA tenderness  Musculoskeletal: Moves all extremities. No obvious deformities. No edema. No calf tenderness.  Skin: Warm and dry.  Neurological:  Alert, awake, and appropriate.  Normal speech.  No acute focal neurological deficits are appreciated.  Psychiatric: Normal affect. Good eye contact. Appropriate in content.    ED Course    Procedures  ED Vital Signs:  Vitals:    10/23/18 0503 10/23/18 0531 10/23/18 0617 10/23/18 0619   BP: (!) 156/87  (!) 147/82 (!) 144/82   Pulse: 61 62 (!) 56 64   Resp: 18      Temp: 98.6 °F (37 °C)      TempSrc: Oral      SpO2: 99%  100% 99%   Weight: 93 kg (205 lb)     "  Height: 5' 11" (1.803 m)       10/23/18 0621 10/23/18 0631   BP: (S) (!) 141/84    Pulse: (S) 70 62   Resp:     Temp:     TempSrc:     SpO2:     Weight:     Height:         Abnormal Lab Results:  Labs Reviewed   CBC W/ AUTO DIFFERENTIAL - Abnormal; Notable for the following components:       Result Value    RBC 4.57 (*)     MCH 31.9 (*)     All other components within normal limits   COMPREHENSIVE METABOLIC PANEL - Abnormal; Notable for the following components:    Glucose 112 (*)     Alkaline Phosphatase 40 (*)     ALT 45 (*)     All other components within normal limits   TROPONIN I        All Lab Results:  Results for orders placed or performed during the hospital encounter of 10/23/18   CBC auto differential   Result Value Ref Range    WBC 6.09 3.90 - 12.70 K/uL    RBC 4.57 (L) 4.60 - 6.20 M/uL    Hemoglobin 14.6 14.0 - 18.0 g/dL    Hematocrit 42.8 40.0 - 54.0 %    MCV 94 82 - 98 fL    MCH 31.9 (H) 27.0 - 31.0 pg    MCHC 34.1 32.0 - 36.0 g/dL    RDW 13.2 11.5 - 14.5 %    Platelets 171 150 - 350 K/uL    MPV 10.0 9.2 - 12.9 fL    Gran # (ANC) 2.8 1.8 - 7.7 K/uL    Lymph # 2.2 1.0 - 4.8 K/uL    Mono # 0.7 0.3 - 1.0 K/uL    Eos # 0.4 0.0 - 0.5 K/uL    Baso # 0.02 0.00 - 0.20 K/uL    Gran% 45.8 38.0 - 73.0 %    Lymph% 35.6 18.0 - 48.0 %    Mono% 11.7 4.0 - 15.0 %    Eosinophil% 6.6 0.0 - 8.0 %    Basophil% 0.3 0.0 - 1.9 %    Differential Method Automated    Comprehensive metabolic panel   Result Value Ref Range    Sodium 141 136 - 145 mmol/L    Potassium 4.1 3.5 - 5.1 mmol/L    Chloride 109 95 - 110 mmol/L    CO2 23 23 - 29 mmol/L    Glucose 112 (H) 70 - 110 mg/dL    BUN, Bld 20 6 - 20 mg/dL    Creatinine 1.3 0.5 - 1.4 mg/dL    Calcium 8.9 8.7 - 10.5 mg/dL    Total Protein 7.1 6.0 - 8.4 g/dL    Albumin 3.7 3.5 - 5.2 g/dL    Total Bilirubin 0.4 0.1 - 1.0 mg/dL    Alkaline Phosphatase 40 (L) 55 - 135 U/L    AST 21 10 - 40 U/L    ALT 45 (H) 10 - 44 U/L    Anion Gap 9 8 - 16 mmol/L    eGFR if African American >60 >60 " mL/min/1.73 m^2    eGFR if non African American >60 >60 mL/min/1.73 m^2   Troponin I   Result Value Ref Range    Troponin I <0.006 0.000 - 0.026 ng/mL         Imaging Results:  Imaging Results          CT Head Without Contrast (In process)                Per [facility] radiology, pt's CT results show no acute intracranial process. Left parietal approach intraventricular catheter. No hydrocephalus.    The EKG was ordered, reviewed, and independently interpreted by the ED provider.  Interpretation time: 5:37  Rate: 68 BPM  Rhythm: normal sinus rhythm  Interpretation: Normal ECG. No STEMI.             The Emergency Provider reviewed the vital signs and test results, which are outlined above.    ED Discussion     5:52 AM: Dr. Villarreal transfers care of pt to Dr. Quintero, pending lab results.    6:49 AM: Reassessed pt at this time.  Pt is awake, alert, and in NAD at this time. Discussed with pt all pertinent ED information and results. Discussed pt dx and plan of tx. Gave pt all f/u and return to the ED instructions. All questions and concerns were addressed at this time. Pt expresses understanding of information and instructions, and is comfortable with plan to discharge. Pt is stable for discharge.      I discussed with patient and/or family/caretaker that evaluation in the ED does not suggest any emergent or life threatening medical conditions requiring immediate intervention beyond what was provided in the ED, and I believe patient is safe for discharge.  Regardless, an unremarkable evaluation in the ED does not preclude the development or presence of a serious of life threatening condition. As such, patient was instructed to return immediately for any worsening or change in current symptoms.    Current Discharge Medication List      START taking these medications    Details   meclizine (ANTIVERT) 25 mg tablet Take 1 tablet (25 mg total) by mouth 3 (three) times daily as needed.  Qty: 20 tablet, Refills: 0          CONTINUE these medications which have NOT CHANGED    Details   abacavir-dolutegravir-lamivud 600- mg Tab Take by mouth once daily.      atorvastatin (LIPITOR) 20 MG tablet Take 20 mg by mouth once daily.      chlorthalidone (HYGROTEN) 25 MG Tab Take 25 mg by mouth every other day.      divalproex (DEPAKOTE) 500 MG TbEC Take 500 mg by mouth 2 (two) times daily.      lancets (ONETOUCH DELICA LANCETS) 33 gauge Misc 1 lancet by Misc.(Non-Drug; Combo Route) route 2 (two) times daily.  Qty: 100 each, Refills: 0      levetiracetam (KEPPRA) 250 MG Tab Take 250 mg by mouth 3 (three) times daily.      oxybutynin (DITROPAN XL) 15 MG TR24 Take 5 mg by mouth once daily.      tamsulosin (FLOMAX) 0.4 mg Cp24 Take 0.4 mg by mouth once daily.      zolpidem (AMBIEN) 10 mg Tab Take 10 mg by mouth nightly as needed.      insulin detemir (LEVEMIR FLEXTOUCH) 100 unit/mL (3 mL) SubQ InPn pen Inject 10 Units into the skin every evening.  Qty: 1 Box, Refills: 0      metformin (GLUCOPHAGE) 500 MG tablet Take 1 tablet (500 mg total) by mouth 2 (two) times daily with meals.  Qty: 60 tablet, Refills: 0               ED Medication(s):  Medications   sodium chloride 0.9% bolus 500 mL (0 mLs Intravenous Stopped 10/23/18 0634)       Follow-up Information     Mukesh Donovan MD.    Specialty:  Internal Medicine  Contact information:  [address] [address] [address]  [phone]                     Medical Decision Making    Medical Decision Making:   Clinical Tests:   Lab Tests: Ordered and Reviewed  Radiological Study: Ordered and Reviewed  Medical Tests: Ordered and Reviewed           Scribe Attestation:   Scribe #1: I performed the above scribed service and the documentation accurately describes the services I performed. I attest to the accuracy of the note.    Attending:   Physician Attestation Statement for Scribe #1: I, Alexandro Villarreal Jr., MD, personally performed the services described in this  documentation, as scribed by Chilango Alexander, in my presence, and it is both accurate and complete.       Scribe Attestation:   Scribe #2: I performed the above scribed service and the documentation accurately describes the services I performed. I attest to the accuracy of the note.    Attending Attestation:           Physician Attestation for Scribe:    Physician Attestation Statement for Scribe #2: I, Ricky Quintero MD, reviewed documentation, as scribed by Radha George in my presence, and it is both accurate and complete. I also acknowledge and confirm the content of the note done by Scribe #1.          Clinical Impression       ICD-10-CM ICD-9-CM   1. Dizziness R42 780.4       Disposition:   Disposition: Discharged  Condition: Stable         Ricky Quintero MD  10/23/18 0656

## 2018-10-24 ENCOUNTER — NURSE TRIAGE (OUTPATIENT)
Dept: ADMINISTRATIVE | Facility: CLINIC | Age: 58
End: 2018-10-24

## 2018-10-25 NOTE — TELEPHONE ENCOUNTER
Patient called to report the following:     -patient reports that he was in hospital for dizziness   -and thinks that the dizziness is from mind paralysis   -patient denies symptoms  -patient advised to f/u provider   -patient states ok     Reason for Disposition   Nursing judgment    Protocols used: ST NO PROTOCOL CALL: SICK ADULT-A-[place]

## 2018-12-20 ENCOUNTER — TELEPHONE (OUTPATIENT)
Dept: OTOLARYNGOLOGY | Facility: CLINIC | Age: 58
End: 2018-12-20

## 2018-12-20 DIAGNOSIS — K11.1 PAROTID GLAND ENLARGEMENT: Primary | ICD-10-CM

## 2018-12-20 NOTE — TELEPHONE ENCOUNTER
----- Message from Debra Lyle sent at 12/20/2018  3:01 PM CST -----  Contact: pt  Calling to schedule an appointment and be worked into the schedule and please advise. [phone] (home)

## 2018-12-20 NOTE — TELEPHONE ENCOUNTER
Pt calling to schedule u/s that was previously ordered by Dr. Llanos.  Order was not active for scheduling.  New order placed.  Will call pt back tomorrow to schedule.

## 2018-12-21 NOTE — TELEPHONE ENCOUNTER
Ultrasound scheduled on 1/3/19 @12:30pm at [facility].  Pt verbalized understanding of appt details.

## 2019-01-03 ENCOUNTER — TELEPHONE (OUTPATIENT)
Dept: OTOLARYNGOLOGY | Facility: CLINIC | Age: 59
End: 2019-01-03

## 2019-01-03 ENCOUNTER — HOSPITAL ENCOUNTER (OUTPATIENT)
Dept: RADIOLOGY | Facility: HOSPITAL | Age: 59
Discharge: HOME | End: 2019-01-03
Attending: ORTHOPAEDIC SURGERY
Payer: MEDICARE

## 2019-01-03 DIAGNOSIS — K11.1 PAROTID GLAND ENLARGEMENT: ICD-10-CM

## 2019-01-03 PROCEDURE — 76536 US SOFT TISSUE HEAD NECK THYROID: ICD-10-PCS | Mod: 26,,, | Performed by: RADIOLOGY

## 2019-01-03 PROCEDURE — 76536 US EXAM OF HEAD AND NECK: CPT | Mod: 26,,, | Performed by: RADIOLOGY

## 2019-01-03 PROCEDURE — 76536 US EXAM OF HEAD AND NECK: CPT | Mod: TC

## 2019-01-03 NOTE — TELEPHONE ENCOUNTER
----- Message from Nadia Llanos MD sent at 1/3/2019  3:00 PM CST -----  Please let Mr. Deleon know that his ultrasound of the parotids showed small cysts in both glands, which is commonly seen in patients with HIV.  There is no dominant mass, and no further treatment or evaluation is needed at this time.

## 2019-01-03 NOTE — TELEPHONE ENCOUNTER
Spoke with pt, advised of results and recommendations.  Pt verbalized understanding.  Pt is questioning hearing aids.  Is he medically cleared for a hearing aid consult?

## 2019-01-04 NOTE — TELEPHONE ENCOUNTER
Called pt to schedule hearing aid consult.  He was unable to book at this time.  He will call back to schedule after he talks to his transportation.

## 2019-03-18 ENCOUNTER — TELEPHONE (OUTPATIENT)
Dept: OTOLARYNGOLOGY | Facility: CLINIC | Age: 59
End: 2019-03-18

## 2019-03-18 NOTE — TELEPHONE ENCOUNTER
I spoke with the patient and was able to schedule the HAC for him.  He verbalized understanding of the date, time and location.

## 2019-03-18 NOTE — TELEPHONE ENCOUNTER
----- Message from Loraine Collier sent at 3/18/2019  8:34 AM CDT -----  Contact: self  Patient would like to make appointment for hearing aid. Please call back at [phone].      Thanks,  Loraine Collier

## 2019-04-11 ENCOUNTER — CLINICAL SUPPORT (OUTPATIENT)
Dept: AUDIOLOGY | Facility: CLINIC | Age: 59
End: 2019-04-11
Payer: MEDICARE

## 2019-04-11 DIAGNOSIS — Z71.89 HEARING AID CONSULTATION: Primary | ICD-10-CM

## 2019-04-11 DIAGNOSIS — H90.3 SENSORINEURAL HEARING LOSS, BILATERAL: Primary | ICD-10-CM

## 2019-04-11 NOTE — PROGRESS NOTES
Indra Deleon was seen 04/11/2019 for a Bone Anchored Hearing Aid evaluation to determine candidacy.  Patient was tested in the sound bobo in the sound field using the [employer] Sentence Test with monitored live voice to the Right ear and speech noise to the Left ear.    The unaided score for list 1 was 22.7% at a + 10 S/N ratio with 35 words correct out of 154.    The score with BAHA demo for list 2 was 53% at a + 10 S/N ratio with 76 words correct out of 143.    Patient subjectively reported little benefit with the BAHA demo for speech clarity, quality, and lateralization. It is not suggested that he proceed with the BAHA surgery. Patient and his son agreed. Patient has hearing aid benefits through [employer]. He is a candidate for a Cros system. His son was provided a copy of his audiogram and encouraged to contact [facility] regarding benefits.

## 2019-04-11 NOTE — PROGRESS NOTES
Indra Deleon was scheduled for a hearing aid consult on 04/11/2019. He has profound sensorineural hearing loss of the right ear. He underwent a Baha evaluation today as well. He is not a Baha candidate. See previous appointment notes for details. Patients insurance has hearing aid benefits that include Cros devices. He and his son were encouraged to contact [employer] regarding these benefits.

## 2019-05-21 ENCOUNTER — TELEPHONE (OUTPATIENT)
Dept: OTOLARYNGOLOGY | Facility: CLINIC | Age: 59
End: 2019-05-21

## 2019-05-21 NOTE — TELEPHONE ENCOUNTER
----- Message from Lindy Maya sent at 5/21/2019  1:15 PM CDT -----  Contact: self  Type:  Test Results    Who Called: pt  Name of Test (Lab/Mammo/Etc): hearing test  Date of Test: n/a  Ordering Provider: mendez   Where the test was performed: [facility] on [address]  Would the patient rather a call back or a response via [employer]? Call back  Best Call Back Number: [phone]  Additional Information: none    Thanks,  Lindy Maya

## 2019-05-21 NOTE — TELEPHONE ENCOUNTER
I am assuming he is asking about his hearing aid consult.  I agree with audiology, that if he does not like the BAHA demonstration, then he would have a CROS aid which would send the sound from the bad ear to the good ear.  If he has any further questions regarding hearing aids, then please forward message to audiology.

## 2019-05-21 NOTE — TELEPHONE ENCOUNTER
The patient is wanting to know the % of his hearing loss in both his right and left ear.  I looked at the hearing test from 5/14/18 but didn't see percentages anywhere but I may not know exactly how to read these results.  I told the patient that you and the audiologist were gone for the day and that we would call him back on tomorrow with the answer.  Can you answer this or should I have the audiologist call him?  Please advise.  Thanks.

## 2019-05-22 ENCOUNTER — TELEPHONE (OUTPATIENT)
Dept: OTOLARYNGOLOGY | Facility: CLINIC | Age: 59
End: 2019-05-22

## 2019-05-22 NOTE — TELEPHONE ENCOUNTER
----- Message from Jamie Cristina sent at 5/22/2019 11:47 AM CDT -----  Contact: pt   .Type:  Patient Returning Call    Who Called: pt   Who Left Message for Patient: rosa   Does the patient know what this is regarding?: not sure   Would the patient rather a call back or a response via [employer]? Callback   Best Call Back Number: ..[phone]    Additional Information:

## 2019-05-22 NOTE — TELEPHONE ENCOUNTER
I left a detailed message letting the patient know the information below from Dr. Llanos.  I asked that he give us a call back if he has any additional question.

## 2019-05-22 NOTE — TELEPHONE ENCOUNTER
No, a normal hearing aid would not work in the right ear as his hearing is too bad in that ear.  He could have an aid in the right ear that would send the sound to an aid in his left ear, called a CROS hearing aid.  If he wants more information, then please schedule him to see audiology.

## 2019-05-22 NOTE — TELEPHONE ENCOUNTER
Patient called back and I told him the same message that I had left on his voicemail.  He now wants to know if a hearing aid would work in his right ear. I told him I would send another message to Dr. Llanos and we would call him back.  Please advise.  Thanks

## 2019-05-22 NOTE — TELEPHONE ENCOUNTER
We do not grade hearing loss with a single percentage.  He has no hearing loss in the left ear, and has a severe hearing loss in the right ear, approximately 85 dB.

## 2021-06-10 ENCOUNTER — TELEPHONE (OUTPATIENT)
Dept: OTOLARYNGOLOGY | Facility: CLINIC | Age: 61
End: 2021-06-10

## 2021-06-17 ENCOUNTER — HOSPITAL ENCOUNTER (EMERGENCY)
Facility: HOSPITAL | Age: 61
Discharge: HOME | End: 2021-06-17
Attending: EMERGENCY MEDICINE
Payer: MEDICARE

## 2021-06-17 VITALS
WEIGHT: 213.19 LBS | SYSTOLIC BLOOD PRESSURE: 160 MMHG | HEART RATE: 83 BPM | BODY MASS INDEX: 29.73 KG/M2 | OXYGEN SATURATION: 97 % | DIASTOLIC BLOOD PRESSURE: 84 MMHG | RESPIRATION RATE: 20 BRPM | TEMPERATURE: 99 F

## 2021-06-17 DIAGNOSIS — R42 VERTIGO: ICD-10-CM

## 2021-06-17 DIAGNOSIS — R42 DIZZINESS: Primary | ICD-10-CM

## 2021-06-17 LAB
ALBUMIN SERPL BCP-MCNC: 4.3 G/DL (ref 3.5–5.2)
ALP SERPL-CCNC: 54 U/L (ref 55–135)
ALT SERPL W/O P-5'-P-CCNC: 32 U/L (ref 10–44)
ANION GAP SERPL CALC-SCNC: 11 MMOL/L (ref 8–16)
AST SERPL-CCNC: 13 U/L (ref 10–40)
BASOPHILS # BLD AUTO: 0.05 K/UL (ref 0–0.2)
BASOPHILS NFR BLD: 0.5 % (ref 0–1.9)
BILIRUB SERPL-MCNC: 0.6 MG/DL (ref 0.1–1)
BILIRUB UR QL STRIP: NEGATIVE
BNP SERPL-MCNC: <10 PG/ML (ref 0–99)
BUN SERPL-MCNC: 18 MG/DL (ref 6–20)
CALCIUM SERPL-MCNC: 9.5 MG/DL (ref 8.7–10.5)
CHLORIDE SERPL-SCNC: 111 MMOL/L (ref 95–110)
CLARITY UR: CLEAR
CO2 SERPL-SCNC: 19 MMOL/L (ref 23–29)
COLOR UR: YELLOW
CREAT SERPL-MCNC: 1.3 MG/DL (ref 0.5–1.4)
DIFFERENTIAL METHOD: ABNORMAL
EOSINOPHIL # BLD AUTO: 1.2 K/UL (ref 0–0.5)
EOSINOPHIL NFR BLD: 12.1 % (ref 0–8)
ERYTHROCYTE [DISTWIDTH] IN BLOOD BY AUTOMATED COUNT: 12.5 % (ref 11.5–14.5)
EST. GFR  (AFRICAN AMERICAN): >60 ML/MIN/1.73 M^2
EST. GFR  (NON AFRICAN AMERICAN): 59 ML/MIN/1.73 M^2
GLUCOSE SERPL-MCNC: 137 MG/DL (ref 70–110)
GLUCOSE UR QL STRIP: ABNORMAL
HCT VFR BLD AUTO: 43.8 % (ref 40–54)
HGB BLD-MCNC: 14.9 G/DL (ref 14–18)
HGB UR QL STRIP: NEGATIVE
IMM GRANULOCYTES # BLD AUTO: 0.03 K/UL (ref 0–0.04)
IMM GRANULOCYTES NFR BLD AUTO: 0.3 % (ref 0–0.5)
KETONES UR QL STRIP: NEGATIVE
LEUKOCYTE ESTERASE UR QL STRIP: NEGATIVE
LYMPHOCYTES # BLD AUTO: 3 K/UL (ref 1–4.8)
LYMPHOCYTES NFR BLD: 29.5 % (ref 18–48)
MCH RBC QN AUTO: 31.4 PG (ref 27–31)
MCHC RBC AUTO-ENTMCNC: 34 G/DL (ref 32–36)
MCV RBC AUTO: 92 FL (ref 82–98)
MONOCYTES # BLD AUTO: 0.9 K/UL (ref 0.3–1)
MONOCYTES NFR BLD: 8.8 % (ref 4–15)
NEUTROPHILS # BLD AUTO: 5 K/UL (ref 1.8–7.7)
NEUTROPHILS NFR BLD: 48.8 % (ref 38–73)
NITRITE UR QL STRIP: NEGATIVE
NRBC BLD-RTO: 0 /100 WBC
PH UR STRIP: 6 [PH] (ref 5–8)
PLATELET # BLD AUTO: 297 K/UL (ref 150–450)
PMV BLD AUTO: 9.2 FL (ref 9.2–12.9)
POTASSIUM SERPL-SCNC: 3.8 MMOL/L (ref 3.5–5.1)
PROT SERPL-MCNC: 7.8 G/DL (ref 6–8.4)
PROT UR QL STRIP: NEGATIVE
RBC # BLD AUTO: 4.75 M/UL (ref 4.6–6.2)
SODIUM SERPL-SCNC: 141 MMOL/L (ref 136–145)
SP GR UR STRIP: 1.02 (ref 1–1.03)
TROPONIN I SERPL DL<=0.01 NG/ML-MCNC: <0.006 NG/ML (ref 0–0.03)
TSH SERPL DL<=0.005 MIU/L-ACNC: 2.74 UIU/ML (ref 0.4–4)
URN SPEC COLLECT METH UR: ABNORMAL
UROBILINOGEN UR STRIP-ACNC: 1 EU/DL
WBC # BLD AUTO: 10.22 K/UL (ref 3.9–12.7)

## 2021-06-17 PROCEDURE — 84443 ASSAY THYROID STIM HORMONE: CPT | Performed by: EMERGENCY MEDICINE

## 2021-06-17 PROCEDURE — 93010 ELECTROCARDIOGRAM REPORT: CPT | Mod: ,,, | Performed by: INTERNAL MEDICINE

## 2021-06-17 PROCEDURE — 93005 ELECTROCARDIOGRAM TRACING: CPT

## 2021-06-17 PROCEDURE — 93010 EKG 12-LEAD: ICD-10-PCS | Mod: ,,, | Performed by: INTERNAL MEDICINE

## 2021-06-17 PROCEDURE — 83880 ASSAY OF NATRIURETIC PEPTIDE: CPT | Performed by: EMERGENCY MEDICINE

## 2021-06-17 PROCEDURE — 85025 COMPLETE CBC W/AUTO DIFF WBC: CPT | Performed by: EMERGENCY MEDICINE

## 2021-06-17 PROCEDURE — 81003 URINALYSIS AUTO W/O SCOPE: CPT | Performed by: EMERGENCY MEDICINE

## 2021-06-17 PROCEDURE — 25000003 PHARM REV CODE 250: Performed by: EMERGENCY MEDICINE

## 2021-06-17 PROCEDURE — 80053 COMPREHEN METABOLIC PANEL: CPT | Performed by: EMERGENCY MEDICINE

## 2021-06-17 PROCEDURE — 84484 ASSAY OF TROPONIN QUANT: CPT | Performed by: EMERGENCY MEDICINE

## 2021-06-17 PROCEDURE — 99285 EMERGENCY DEPT VISIT HI MDM: CPT | Mod: 25

## 2021-06-17 RX ORDER — MECLIZINE HYDROCHLORIDE 25 MG/1
25 TABLET ORAL
Status: COMPLETED | OUTPATIENT
Start: 2021-06-17 | End: 2021-06-17

## 2021-06-17 RX ORDER — MECLIZINE HYDROCHLORIDE 25 MG/1
25 TABLET ORAL 3 TIMES DAILY PRN
Qty: 20 TABLET | Refills: 0 | Status: SHIPPED | OUTPATIENT
Start: 2021-06-17

## 2021-06-17 RX ADMIN — MECLIZINE HYDROCHLORIDE 25 MG: 25 TABLET ORAL at 02:06

## 2025-04-30 ENCOUNTER — HOSPITAL ENCOUNTER (EMERGENCY)
Facility: HOSPITAL | Age: 65
Discharge: HOME | End: 2025-04-30
Attending: EMERGENCY MEDICINE
Payer: MEDICARE

## 2025-04-30 VITALS
RESPIRATION RATE: 16 BRPM | HEART RATE: 100 BPM | TEMPERATURE: 98 F | HEIGHT: 71 IN | SYSTOLIC BLOOD PRESSURE: 126 MMHG | BODY MASS INDEX: 27.58 KG/M2 | WEIGHT: 197 LBS | OXYGEN SATURATION: 96 % | DIASTOLIC BLOOD PRESSURE: 84 MMHG

## 2025-04-30 DIAGNOSIS — R31.9 HEMATURIA, UNSPECIFIED TYPE: ICD-10-CM

## 2025-04-30 DIAGNOSIS — R39.15 URINARY URGENCY: Primary | ICD-10-CM

## 2025-04-30 LAB
ABSOLUTE EOSINOPHIL (OHS): 0.33 K/UL
ABSOLUTE MONOCYTE (OHS): 1.12 K/UL (ref 0.3–1)
ABSOLUTE NEUTROPHIL COUNT (OHS): 4.34 K/UL (ref 1.8–7.7)
ALBUMIN SERPL BCP-MCNC: 4.2 G/DL (ref 3.5–5.2)
ALP SERPL-CCNC: 55 UNIT/L (ref 40–150)
ALT SERPL W/O P-5'-P-CCNC: 79 UNIT/L (ref 10–44)
ANION GAP (OHS): 14 MMOL/L (ref 8–16)
AST SERPL-CCNC: 34 UNIT/L (ref 11–45)
BACTERIA #/AREA URNS AUTO: ABNORMAL /HPF
BASOPHILS # BLD AUTO: 0.03 K/UL
BASOPHILS NFR BLD AUTO: 0.4 %
BILIRUB SERPL-MCNC: 0.6 MG/DL (ref 0.1–1)
BILIRUB UR QL STRIP.AUTO: NEGATIVE
BUN SERPL-MCNC: 20 MG/DL (ref 8–23)
CALCIUM SERPL-MCNC: 9.8 MG/DL (ref 8.7–10.5)
CHLORIDE SERPL-SCNC: 110 MMOL/L (ref 95–110)
CLARITY UR: CLEAR
CO2 SERPL-SCNC: 16 MMOL/L (ref 23–29)
COLOR UR AUTO: YELLOW
CREAT SERPL-MCNC: 1.4 MG/DL (ref 0.5–1.4)
ERYTHROCYTE [DISTWIDTH] IN BLOOD BY AUTOMATED COUNT: 12.9 % (ref 11.5–14.5)
GFR SERPLBLD CREATININE-BSD FMLA CKD-EPI: 56 ML/MIN/1.73/M2
GLUCOSE SERPL-MCNC: 173 MG/DL (ref 70–110)
GLUCOSE UR QL STRIP: ABNORMAL
HCT VFR BLD AUTO: 39.5 % (ref 40–54)
HGB BLD-MCNC: 13 GM/DL (ref 14–18)
HGB UR QL STRIP: ABNORMAL
HOLD SPECIMEN: NORMAL
HYALINE CASTS UR QL AUTO: 1 /LPF (ref 0–1)
IMM GRANULOCYTES # BLD AUTO: 0.03 K/UL (ref 0–0.04)
IMM GRANULOCYTES NFR BLD AUTO: 0.4 % (ref 0–0.5)
KETONES UR QL STRIP: NEGATIVE
LEUKOCYTE ESTERASE UR QL STRIP: NEGATIVE
LYMPHOCYTES # BLD AUTO: 2.55 K/UL (ref 1–4.8)
MCH RBC QN AUTO: 28.6 PG (ref 27–31)
MCHC RBC AUTO-ENTMCNC: 32.9 G/DL (ref 32–36)
MCV RBC AUTO: 87 FL (ref 82–98)
MICROSCOPIC COMMENT: ABNORMAL
NITRITE UR QL STRIP: NEGATIVE
NUCLEATED RBC (/100WBC) (OHS): 0 /100 WBC
PH UR STRIP: 6 [PH]
PLATELET # BLD AUTO: 266 K/UL (ref 150–450)
PMV BLD AUTO: 8.9 FL (ref 9.2–12.9)
POCT GLUCOSE: 174 MG/DL (ref 70–110)
POTASSIUM SERPL-SCNC: 4.2 MMOL/L (ref 3.5–5.1)
PROT SERPL-MCNC: 8.5 GM/DL (ref 6–8.4)
PROT UR QL STRIP: ABNORMAL
RBC # BLD AUTO: 4.54 M/UL (ref 4.6–6.2)
RBC #/AREA URNS AUTO: 9 /HPF (ref 0–4)
RELATIVE EOSINOPHIL (OHS): 3.9 %
RELATIVE LYMPHOCYTE (OHS): 30.4 % (ref 18–48)
RELATIVE MONOCYTE (OHS): 13.3 % (ref 4–15)
RELATIVE NEUTROPHIL (OHS): 51.6 % (ref 38–73)
SODIUM SERPL-SCNC: 140 MMOL/L (ref 136–145)
SP GR UR STRIP: 1.02
SQUAMOUS #/AREA URNS AUTO: 0 /HPF
UROBILINOGEN UR STRIP-ACNC: NEGATIVE EU/DL
WBC # BLD AUTO: 8.4 K/UL (ref 3.9–12.7)
WBC #/AREA URNS AUTO: 2 /HPF (ref 0–5)
YEAST UR QL AUTO: ABNORMAL /HPF

## 2025-04-30 PROCEDURE — 81003 URINALYSIS AUTO W/O SCOPE: CPT | Performed by: NURSE PRACTITIONER

## 2025-04-30 PROCEDURE — 82947 ASSAY GLUCOSE BLOOD QUANT: CPT | Performed by: NURSE PRACTITIONER

## 2025-04-30 PROCEDURE — 85025 COMPLETE CBC W/AUTO DIFF WBC: CPT | Performed by: NURSE PRACTITIONER

## 2025-04-30 PROCEDURE — 96360 HYDRATION IV INFUSION INIT: CPT

## 2025-04-30 PROCEDURE — 99284 EMERGENCY DEPT VISIT MOD MDM: CPT | Mod: 25

## 2025-04-30 PROCEDURE — 82962 GLUCOSE BLOOD TEST: CPT

## 2025-04-30 PROCEDURE — 25000003 PHARM REV CODE 250: Performed by: NURSE PRACTITIONER

## 2025-04-30 RX ORDER — SODIUM CHLORIDE 9 MG/ML
1000 INJECTION, SOLUTION INTRAVENOUS
Status: COMPLETED | OUTPATIENT
Start: 2025-04-30 | End: 2025-04-30

## 2025-04-30 RX ORDER — DOXYCYCLINE 100 MG/1
100 CAPSULE ORAL 2 TIMES DAILY
Qty: 14 CAPSULE | Refills: 0 | Status: SHIPPED | OUTPATIENT
Start: 2025-04-30 | End: 2025-05-07

## 2025-04-30 RX ADMIN — SODIUM CHLORIDE 1000 ML: 9 INJECTION, SOLUTION INTRAVENOUS at 12:04

## 2025-04-30 NOTE — ED PROVIDER NOTES
Encounter Date: 4/30/2025       History     Chief Complaint   Patient presents with    Urinary Frequency     Pt went to urgent care today for urinary frequency.  He was told to go to the ED due to elevated BP and blood glucose.  Pt denies other complaints.     64-year-old male with complaint of urinary urgency since last night.  Patient was told to go to the ER today because his blood sugar was 170.  Patient denies vomiting.  Patient denies abdominal pain.  Patient denies chest pain.  Patient denies weakness.        Review of patient's allergies indicates:  No Known Allergies  Past Medical History:   Diagnosis Date    Blind     Diabetes mellitus     HIV (human immunodeficiency virus infection) 2009    Hypertension     Seizures      Past Surgical History:   Procedure Laterality Date    BRAIN SURGERY      KNEE SURGERY       No family history on file.  Social History[1]  Review of Systems   Constitutional:  Negative for fever.   HENT:  Negative for sore throat.    Respiratory:  Negative for shortness of breath.    Cardiovascular:  Negative for chest pain.   Gastrointestinal:  Negative for nausea.   Genitourinary:  Positive for frequency and urgency. Negative for dysuria.   Musculoskeletal:  Negative for back pain.   Skin:  Negative for rash.   Neurological:  Negative for weakness.   Hematological:  Does not bruise/bleed easily.       Physical Exam     Initial Vitals [04/30/25 1218]   BP Pulse Resp Temp SpO2   126/84 (!) 128 16 98.4 °F (36.9 °C) 96 %      MAP       --         Physical Exam    Nursing note and vitals reviewed.  Constitutional: He appears well-developed and well-nourished.   HENT:   Head: Normocephalic and atraumatic.   Eyes: Conjunctivae are normal. Pupils are equal, round, and reactive to light.   Neck: Neck supple.   Normal range of motion.  Cardiovascular:  Normal rate, regular rhythm, normal heart sounds and intact distal pulses.           Pulmonary/Chest: Breath sounds normal.   Abdominal: Abdomen  is soft. There is no rebound and no guarding.   Musculoskeletal:         General: Normal range of motion.      Cervical back: Normal range of motion and neck supple.     Neurological: He is alert.   Skin: Skin is warm and dry.   Psychiatric: He has a normal mood and affect. His behavior is normal. Thought content normal.       Labs Reviewed   COMPREHENSIVE METABOLIC PANEL - Abnormal       Result Value    Sodium 140      Potassium 4.2      Chloride 110      CO2 16 (*)     Glucose 173 (*)     BUN 20      Creatinine 1.4      Calcium 9.8      Protein Total 8.5 (*)     Albumin 4.2      Bilirubin Total 0.6      ALP 55      AST 34      ALT 79 (*)     Anion Gap 14      eGFR 56 (*)    URINALYSIS, REFLEX TO URINE CULTURE - Abnormal    Color, UA Yellow      Appearance, UA Clear      pH, UA 6.0      Spec Grav UA 1.020      Protein, UA 1+ (*)     Glucose, UA 3+ (*)     Ketones, UA Negative      Bilirubin, UA Negative      Blood, UA 1+ (*)     Nitrites, UA Negative      Urobilinogen, UA Negative      Leukocyte Esterase, UA Negative     CBC WITH DIFFERENTIAL - Abnormal    WBC 8.40      RBC 4.54 (*)     HGB 13.0 (*)     HCT 39.5 (*)     MCV 87      MCH 28.6      MCHC 32.9      RDW 12.9      Platelet Count 266      MPV 8.9 (*)     Nucleated RBC 0      Neut % 51.6      Lymph % 30.4      Mono % 13.3      Eos % 3.9      Basophil % 0.4      Imm Grans % 0.4      Neut # 4.34      Lymph # 2.55      Mono # 1.12 (*)     Eos # 0.33      Baso # 0.03      Imm Grans # 0.03     URINALYSIS MICROSCOPIC - Abnormal    RBC, UA 9 (*)     WBC, UA 2      Bacteria, UA None      Yeast, UA None      Squamous Epithelial Cells, UA 0      Hyaline Casts, UA 1      Microscopic Comment       CBC W/ AUTO DIFFERENTIAL    Narrative:     The following orders were created for panel order CBC auto differential.  Procedure                               Abnormality         Status                     ---------                               -----------         ------                      CBC with Differential[[id number]]       Abnormal            Final result                 Please view results for these tests on the individual orders.   [facility]         ED Course   Procedures  Labs Reviewed   COMPREHENSIVE METABOLIC PANEL - Abnormal       Result Value    Sodium 140      Potassium 4.2      Chloride 110      CO2 16 (*)     Glucose 173 (*)     BUN 20      Creatinine 1.4      Calcium 9.8      Protein Total 8.5 (*)     Albumin 4.2      Bilirubin Total 0.6      ALP 55      AST 34      ALT 79 (*)     Anion Gap 14      eGFR 56 (*)    URINALYSIS, REFLEX TO URINE CULTURE - Abnormal    Color, UA Yellow      Appearance, UA Clear      pH, UA 6.0      Spec Grav UA 1.020      Protein, UA 1+ (*)     Glucose, UA 3+ (*)     Ketones, UA Negative      Bilirubin, UA Negative      Blood, UA 1+ (*)     Nitrites, UA Negative      Urobilinogen, UA Negative      Leukocyte Esterase, UA Negative     CBC WITH DIFFERENTIAL - Abnormal    WBC 8.40      RBC 4.54 (*)     HGB 13.0 (*)     HCT 39.5 (*)     MCV 87      MCH 28.6      MCHC 32.9      RDW 12.9      Platelet Count 266      MPV 8.9 (*)     Nucleated RBC 0      Neut % 51.6      Lymph % 30.4      Mono % 13.3      Eos % 3.9      Basophil % 0.4      Imm Grans % 0.4      Neut # 4.34      Lymph # 2.55      Mono # 1.12 (*)     Eos # 0.33      Baso # 0.03      Imm Grans # 0.03     URINALYSIS MICROSCOPIC - Abnormal    RBC, UA 9 (*)     WBC, UA 2      Bacteria, UA None      Yeast, UA None      Squamous Epithelial Cells, UA 0      Hyaline Casts, UA 1      Microscopic Comment       CBC W/ AUTO DIFFERENTIAL    Narrative:     The following orders were created for panel order CBC auto differential.  Procedure                               Abnormality         Status                     ---------                               -----------         ------                     CBC with Differential[[id number]]       Abnormal            Final result                 Please  view results for these tests on the individual orders.   [facility]          Vitals:    04/30/25 1333   BP:    Pulse: 100   Resp:    Temp:        Imaging Results    None          Medications   0.9% NaCl infusion (0 mLs Intravenous Stopped 4/30/25 1334)     Medical Decision Making  1:34 PM  Patient resting comfortably.  Patient reports feeling well.  Patient denies urinary urgency or frequency.  Patient is not hyperglycemic.  Patient has no evidence of urinary tract infection but patient does have a few RBC in urine. Will place on doxycycline and refer to urology.     Amount and/or Complexity of Data Reviewed  Labs: ordered.    Risk  Prescription drug management.                                      Clinical Impression:  Final diagnoses:  [R39.15] Urinary urgency (Primary)  [R31.9] Hematuria, unspecified type          ED Disposition Condition    Discharge Stable          ED Prescriptions       Medication Sig Dispense Start Date End Date Auth. Provider    doxycycline (VIBRAMYCIN) 100 MG Cap Take 1 capsule (100 mg total) by mouth 2 (two) times daily. for 7 days 14 capsule 4/30/2025 5/7/2025 David Alvarado NP          Follow-up Information       Follow up With Specialties Details Why Contact Info    [facility]  Schedule an appointment as soon as possible for a visit in 2 days  [phone]                 [1]   Social History  Tobacco Use    Smoking status: Never   Substance Use Topics    Alcohol use: No        David Alvarado NP  04/30/25 3434

## 2025-06-25 ENCOUNTER — TELEPHONE (OUTPATIENT)
Dept: UROLOGY | Facility: CLINIC | Age: 65
End: 2025-06-25
Payer: MEDICARE